# Patient Record
Sex: MALE | Race: BLACK OR AFRICAN AMERICAN | NOT HISPANIC OR LATINO | URBAN - METROPOLITAN AREA
[De-identification: names, ages, dates, MRNs, and addresses within clinical notes are randomized per-mention and may not be internally consistent; named-entity substitution may affect disease eponyms.]

---

## 2023-02-09 ENCOUNTER — HOSPITAL ENCOUNTER (EMERGENCY)
Dept: DATA CONVERSION | Facility: HOSPITAL | Age: 38
Discharge: HOME | End: 2023-02-10
Attending: EMERGENCY MEDICINE

## 2023-02-09 DIAGNOSIS — E11.65 TYPE 2 DIABETES MELLITUS WITH HYPERGLYCEMIA (MULTI): ICD-10-CM
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
## 2023-02-09 DIAGNOSIS — F17.200 NICOTINE DEPENDENCE, UNSPECIFIED, UNCOMPLICATED: ICD-10-CM

## 2023-02-09 DIAGNOSIS — T14.8XXA OTHER INJURY OF UNSPECIFIED BODY REGION, INITIAL ENCOUNTER: ICD-10-CM
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
## 2023-02-09 DIAGNOSIS — M79.651 PAIN IN RIGHT THIGH: ICD-10-CM

## 2023-02-09 DIAGNOSIS — Z20.822 CONTACT WITH AND (SUSPECTED) EXPOSURE TO COVID-19: ICD-10-CM

## 2023-02-09 DIAGNOSIS — S61.411A LACERATION WITHOUT FOREIGN BODY OF RIGHT HAND, INITIAL ENCOUNTER: ICD-10-CM

## 2023-02-09 DIAGNOSIS — M79.643 PAIN IN UNSPECIFIED HAND: ICD-10-CM

## 2023-02-09 DIAGNOSIS — S71.141A PUNCTURE WOUND WITH FOREIGN BODY, RIGHT THIGH, INITIAL ENCOUNTER: ICD-10-CM
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
## 2023-02-09 DIAGNOSIS — Z23 ENCOUNTER FOR IMMUNIZATION: ICD-10-CM

## 2023-02-09 DIAGNOSIS — T07.XXXA UNSPECIFIED MULTIPLE INJURIES, INITIAL ENCOUNTER: ICD-10-CM

## 2023-02-09 LAB
ERYTHROCYTE DISTRIBUTION WIDTH (RATIO) BY AUTOMATED COUNT: 11.9 % (ref 11.5–14.5)
ERYTHROCYTE MEAN CORPUSCULAR HEMOGLOBIN CONCENTRATION (G/DL) BY AUTOMATED: 33.6 G/DL (ref 32–36)
ERYTHROCYTE MEAN CORPUSCULAR VOLUME (FL) BY AUTOMATED COUNT: 90 FL (ref 80–100)
ERYTHROCYTES (10*6/UL) IN BLOOD BY AUTOMATED COUNT: 4.72 X10E12/L (ref 4.5–5.9)
ETHANOL (MG/DL) IN SER/PLAS: <10 MG/DL
FIBRINOGEN (MG/DL) IN PPP BY COAGULATION ASSAY: 291 MG/DL (ref 200–400)
HEMATOCRIT (%) IN BLOOD BY AUTOMATED COUNT: 42.6 % (ref 41–52)
HEMOGLOBIN (G/DL) IN BLOOD: 14.3 G/DL (ref 13.5–17.5)
INR IN PPP BY COAGULATION ASSAY: 1 (ref 0.9–1.1)
LEUKOCYTES (10*3/UL) IN BLOOD BY AUTOMATED COUNT: 8.2 X10E9/L (ref 4.4–11.3)
MAGNESIUM (MG/DL) IN SER/PLAS: 1.69 MG/DL (ref 1.6–2.4)
NRBC (PER 100 WBCS) BY AUTOMATED COUNT: 0 /100 WBC (ref 0–0)
PATIENT TEMPERATURE: 37 DEGREES C
PATIENT TEMPERATURE: 37 DEGREES C
PHOSPHATE (MG/DL) IN SER/PLAS: 2.3 MG/DL (ref 2.5–4.9)
PLATELETS (10*3/UL) IN BLOOD AUTOMATED COUNT: 250 X10E9/L (ref 150–450)
POCT ANION GAP, VENOUS: 11 MMOL/L (ref 10–25)
POCT ANION GAP, VENOUS: 9 MMOL/L (ref 10–25)
POCT BASE EXCESS, VENOUS: 1.4 MMOL/L (ref -2–3)
POCT BASE EXCESS, VENOUS: 3.3 MMOL/L (ref -2–3)
POCT CHLORIDE, VENOUS: 101 MMOL/L (ref 98–107)
POCT CHLORIDE, VENOUS: 104 MMOL/L (ref 98–107)
POCT GLUCOSE, VENOUS: 281 MG/DL (ref 74–99)
POCT GLUCOSE, VENOUS: 332 MG/DL (ref 74–99)
POCT HCO3, VENOUS: 26.6 MMOL/L (ref 22–26)
POCT HCO3, VENOUS: 26.9 MMOL/L (ref 22–26)
POCT HEMATOCRIT, VENOUS: 43 % (ref 41–52)
POCT HEMATOCRIT, VENOUS: 44 % (ref 41–52)
POCT HEMOGLOBIN, VENOUS: 14.4 G/DL (ref 13.5–17.5)
POCT HEMOGLOBIN, VENOUS: 14.8 G/DL (ref 13.5–17.5)
POCT IONIZED CALCIUM, VENOUS: 1.15 MMOL/L (ref 1.1–1.33)
POCT IONIZED CALCIUM, VENOUS: 1.16 MMOL/L (ref 1.1–1.33)
POCT LACTATE, VENOUS: 1 MMOL/L (ref 0.4–2)
POCT LACTATE, VENOUS: 3.8 MMOL/L (ref 0.4–2)
POCT OXY HEMOGLOBIN, VENOUS: 44.9 % (ref 45–75)
POCT OXY HEMOGLOBIN, VENOUS: 70.2 % (ref 45–75)
POCT PCO2, VENOUS: 37 MMHG (ref 41–51)
POCT PCO2, VENOUS: 43 MMHG (ref 41–51)
POCT PH, VENOUS: 7.4 (ref 7.33–7.43)
POCT PH, VENOUS: 7.47 (ref 7.33–7.43)
POCT PO2, VENOUS: 27 MMHG (ref 35–45)
POCT PO2, VENOUS: 45 MMHG (ref 35–45)
POCT POTASSIUM, VENOUS: 3.7 MMOL/L (ref 3.5–5.3)
POCT POTASSIUM, VENOUS: 3.9 MMOL/L (ref 3.5–5.3)
POCT SO2, VENOUS: 53 % (ref 45–75)
POCT SO2, VENOUS: 80 % (ref 45–75)
POCT SODIUM, VENOUS: 135 MMOL/L (ref 136–145)
POCT SODIUM, VENOUS: 136 MMOL/L (ref 136–145)
PROTHROMBIN TIME (PT) IN PPP BY COAGULATION ASSAY: 11.8 SEC (ref 9.8–13.4)
SARS-COV-2 RESULT: NOT DETECTED

## 2023-02-10 LAB
ABO GROUP (TYPE) IN BLOOD: NORMAL
ANION GAP IN SER/PLAS: 15 MMOL/L (ref 10–20)
ANTIBODY SCREEN: NORMAL
CALCIUM (MG/DL) IN SER/PLAS: 9.4 MG/DL (ref 8.6–10.6)
CARBON DIOXIDE, TOTAL (MMOL/L) IN SER/PLAS: 24 MMOL/L (ref 21–32)
CHLORIDE (MMOL/L) IN SER/PLAS: 102 MMOL/L (ref 98–107)
CREATININE (MG/DL) IN SER/PLAS: 1.19 MG/DL (ref 0.5–1.3)
GFR MALE: 79 ML/MIN/1.73M2
GLUCOSE (MG/DL) IN SER/PLAS: 325 MG/DL (ref 74–99)
POTASSIUM (MMOL/L) IN SER/PLAS: 3.9 MMOL/L (ref 3.5–5.3)
RH FACTOR: NORMAL
SODIUM (MMOL/L) IN SER/PLAS: 137 MMOL/L (ref 136–145)
UREA NITROGEN (MG/DL) IN SER/PLAS: 10 MG/DL (ref 6–23)
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
